# Patient Record
Sex: FEMALE | HISPANIC OR LATINO
[De-identification: names, ages, dates, MRNs, and addresses within clinical notes are randomized per-mention and may not be internally consistent; named-entity substitution may affect disease eponyms.]

---

## 2023-03-02 ENCOUNTER — NON-APPOINTMENT (OUTPATIENT)
Age: 43
End: 2023-03-02

## 2023-05-21 ENCOUNTER — NON-APPOINTMENT (OUTPATIENT)
Age: 43
End: 2023-05-21

## 2023-05-30 ENCOUNTER — NON-APPOINTMENT (OUTPATIENT)
Age: 43
End: 2023-05-30

## 2023-05-30 ENCOUNTER — APPOINTMENT (OUTPATIENT)
Dept: FAMILY MEDICINE | Facility: CLINIC | Age: 43
End: 2023-05-30
Payer: COMMERCIAL

## 2023-05-30 ENCOUNTER — TRANSCRIPTION ENCOUNTER (OUTPATIENT)
Age: 43
End: 2023-05-30

## 2023-05-30 VITALS
TEMPERATURE: 98.4 F | SYSTOLIC BLOOD PRESSURE: 126 MMHG | HEIGHT: 62.99 IN | OXYGEN SATURATION: 99 % | DIASTOLIC BLOOD PRESSURE: 70 MMHG | HEART RATE: 69 BPM | RESPIRATION RATE: 16 BRPM | WEIGHT: 143 LBS | BODY MASS INDEX: 25.34 KG/M2

## 2023-05-30 DIAGNOSIS — R94.31 ABNORMAL ELECTROCARDIOGRAM [ECG] [EKG]: ICD-10-CM

## 2023-05-30 DIAGNOSIS — Z83.438 FAMILY HISTORY OF OTHER DISORDER OF LIPOPROTEIN METABOLISM AND OTHER LIPIDEMIA: ICD-10-CM

## 2023-05-30 DIAGNOSIS — Z87.59 PERSONAL HISTORY OF OTHER COMPLICATIONS OF PREGNANCY, CHILDBIRTH AND THE PUERPERIUM: ICD-10-CM

## 2023-05-30 DIAGNOSIS — R79.89 OTHER SPECIFIED ABNORMAL FINDINGS OF BLOOD CHEMISTRY: ICD-10-CM

## 2023-05-30 DIAGNOSIS — E78.2 MIXED HYPERLIPIDEMIA: ICD-10-CM

## 2023-05-30 DIAGNOSIS — Z00.00 ENCOUNTER FOR GENERAL ADULT MEDICAL EXAMINATION W/OUT ABNORMAL FINDINGS: ICD-10-CM

## 2023-05-30 DIAGNOSIS — Z78.9 OTHER SPECIFIED HEALTH STATUS: ICD-10-CM

## 2023-05-30 PROCEDURE — 99386 PREV VISIT NEW AGE 40-64: CPT | Mod: 25

## 2023-05-30 PROCEDURE — 93000 ELECTROCARDIOGRAM COMPLETE: CPT

## 2023-05-30 PROCEDURE — 36415 COLL VENOUS BLD VENIPUNCTURE: CPT

## 2023-05-30 NOTE — PAST MEDICAL HISTORY
[Menstruating] : menstruating [Approximately ___] : the LMP was approximately [unfilled] [Normal Amount/Duration] : it was of a normal amount and duration [Regular Cycle Intervals] : have been regular [Total Preg ___] : G[unfilled] [Live Births ___] : P[unfilled]  [Full Term ___] : Full Term: [unfilled] [Premature ___] : Premature: [unfilled] [Abortions ___] : Abortions:[unfilled] [Living ___] : Living: [unfilled] [AB Spont ___] : miscarriages: [unfilled]
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
## 2023-05-30 NOTE — PLAN
[FreeTextEntry1] : \par \par #HM\par -CMP, CBC, Lipid, A1C, TSH w. rFT4, vitamin B12 level\par -Pt verbally consented to HIV and Hep C testing \par -Flu shot annually recommended- pt states she does annual flu shot \par -COVID-19 vaccination series completed\par -COVID19 booster x1\par -Tdap q10 years recommended (received during pregnancy)\par -Use of sunscreen\par -Physical activity and healthy lifestyle recommended\par -Mammogram\par \par #Epigastric pain\par -GGT, B-HCG, H. pylori stool Ag, Lipase\par -US abdomen \par -Pantoprazole 40 mg QD after stool Ag test completed \par \par #Abnormal EKG\par -EKG done today in office- T wave inversion in V3, V4- low voltage in precordial leads, RSR- V1, left axis, possible infarct\par -Cardiology evaluation recommended\par -TTE\par \par Labs drawn in office \par \par F/u in 6 weeks

## 2023-05-30 NOTE — HISTORY OF PRESENT ILLNESS
[FreeTextEntry1] : comprehensive physical exam\par epigastric discomfort [de-identified] : 41 y/o female with hx of elevated cholesterol and triglycerides presents today for comprehensive physical exam and to establish care.\par \par Pt also c/o epigastric discomfort x 2 days. She was vomiting and feeling nauseous but states these symptoms are intermittent and have resolved. She states epigastric pain resolved with emesis and symptoms are worse with eating pasta sauce. \par Patient states 2 months ago she felt a pressure in her epigastric region as well. She states she has had stomach issues in the past as well. \par \par Pt states she took Lamisil for nail fungus in the last few months and one month later she had to stop due to liver enzymes being affected. \par She states one month later liver levels were still elevated.\par

## 2023-05-30 NOTE — HEALTH RISK ASSESSMENT
[With Family] : lives with family [# of Members in Household ___] :  household currently consist of [unfilled] member(s) [Unemployed] : unemployed [College] : College [] :  [Sexually Active] : sexually active [Feels Safe at Home] : Feels safe at home [Good] : ~his/her~  mood as  good [Yes] : Yes [2 - 4 times a month (2 pts)] : 2-4 times a month (2 points) [1 or 2 (0 pts)] : 1 or 2 (0 points) [Never (0 pts)] : Never (0 points) [No] : In the past 12 months have you used drugs other than those required for medical reasons? No [No falls in past year] : Patient reported no falls in the past year [0] : 2) Feeling down, depressed, or hopeless: Not at all (0) [PHQ-2 Negative - No further assessment needed] : PHQ-2 Negative - No further assessment needed [Patient reported PAP Smear was normal] : Patient reported PAP Smear was normal [HIV Test offered] : HIV Test offered [Hepatitis C test offered] : Hepatitis C test offered [None] : None [Fully functional (bathing, dressing, toileting, transferring, walking, feeding)] : Fully functional (bathing, dressing, toileting, transferring, walking, feeding) [Fully functional (using the telephone, shopping, preparing meals, housekeeping, doing laundry, using] : Fully functional and needs no help or supervision to perform IADLs (using the telephone, shopping, preparing meals, housekeeping, doing laundry, using transportation, managing medications and managing finances) [Never] : Never [Audit-CScore] : 2 [de-identified] : sedentary  [de-identified] : wide variety- carbs, meat, chicken  [[id number]] : 0 [Change in mental status noted] : No change in mental status noted [Reports changes in vision] : Reports no changes in vision [Reports changes in dental health] : Reports no changes in dental health [PapSmearDate] : 05/23 [ColonoscopyComments] : never had one [de-identified] : lives with  and 1 yo son

## 2023-05-30 NOTE — REVIEW OF SYSTEMS
[Abdominal Pain] : abdominal pain [Heartburn] : heartburn [Negative] : Heme/Lymph [Nausea] : nausea [Vomiting] : vomiting [Constipation] : no constipation [Diarrhea] : diarrhea [Melena] : no melena

## 2023-05-30 NOTE — ASSESSMENT
[FreeTextEntry1] :  41 y/o female presents today for comprehensive physical exam and to establish care.

## 2023-06-01 LAB
ALBUMIN SERPL ELPH-MCNC: 4.7 G/DL
ALP BLD-CCNC: 197 U/L
ALT SERPL-CCNC: 883 U/L
ANION GAP SERPL CALC-SCNC: 13 MMOL/L
AST SERPL-CCNC: 142 U/L
BILIRUB SERPL-MCNC: 0.6 MG/DL
BUN SERPL-MCNC: 6 MG/DL
CALCIUM SERPL-MCNC: 9.9 MG/DL
CHLORIDE SERPL-SCNC: 102 MMOL/L
CHOLEST SERPL-MCNC: 233 MG/DL
CO2 SERPL-SCNC: 24 MMOL/L
CREAT SERPL-MCNC: 0.64 MG/DL
EGFR: 114 ML/MIN/1.73M2
GGT SERPL-CCNC: 342 U/L
GLUCOSE SERPL-MCNC: 115 MG/DL
H PYLORI AG STL QL: NEGATIVE
HCG SERPL-MCNC: <1 MIU/ML
HCV AB SER QL: NONREACTIVE
HCV S/CO RATIO: 0.13 S/CO
HDLC SERPL-MCNC: 48 MG/DL
HIV1+2 AB SPEC QL IA.RAPID: NONREACTIVE
LDLC SERPL CALC-MCNC: 152 MG/DL
LPL SERPL-CCNC: 21 U/L
NONHDLC SERPL-MCNC: 185 MG/DL
POTASSIUM SERPL-SCNC: 4.7 MMOL/L
PROT SERPL-MCNC: 7.3 G/DL
SODIUM SERPL-SCNC: 139 MMOL/L
TRIGL SERPL-MCNC: 167 MG/DL
TSH SERPL-ACNC: 1.35 UIU/ML
VIT B12 SERPL-MCNC: 1130 PG/ML

## 2023-06-08 LAB
ESTIMATED AVERAGE GLUCOSE: 123 MG/DL
HBA1C MFR BLD HPLC: 5.9 %

## 2023-06-26 ENCOUNTER — APPOINTMENT (OUTPATIENT)
Dept: FAMILY MEDICINE | Facility: CLINIC | Age: 43
End: 2023-06-26
Payer: COMMERCIAL

## 2023-06-26 VITALS
DIASTOLIC BLOOD PRESSURE: 81 MMHG | SYSTOLIC BLOOD PRESSURE: 121 MMHG | RESPIRATION RATE: 14 BRPM | OXYGEN SATURATION: 98 % | BODY MASS INDEX: 26.4 KG/M2 | TEMPERATURE: 98.2 F | HEIGHT: 62.99 IN | HEART RATE: 64 BPM | WEIGHT: 149 LBS

## 2023-06-26 DIAGNOSIS — K80.20 CALCULUS OF GALLBLADDER W/OUT CHOLECYSTITIS W/OUT OBSTRUCTION: ICD-10-CM

## 2023-06-26 DIAGNOSIS — Z87.898 PERSONAL HISTORY OF OTHER SPECIFIED CONDITIONS: ICD-10-CM

## 2023-06-26 DIAGNOSIS — R10.9 UNSPECIFIED ABDOMINAL PAIN: ICD-10-CM

## 2023-06-26 DIAGNOSIS — R74.01 ELEVATION OF LEVELS OF LIVER TRANSAMINASE LEVELS: ICD-10-CM

## 2023-06-26 DIAGNOSIS — R92.2 INCONCLUSIVE MAMMOGRAM: ICD-10-CM

## 2023-06-26 DIAGNOSIS — R79.89 OTHER SPECIFIED ABNORMAL FINDINGS OF BLOOD CHEMISTRY: ICD-10-CM

## 2023-06-26 DIAGNOSIS — Z87.19 PERSONAL HISTORY OF OTHER DISEASES OF THE DIGESTIVE SYSTEM: ICD-10-CM

## 2023-06-26 DIAGNOSIS — R74.8 ABNORMAL LEVELS OF OTHER SERUM ENZYMES: ICD-10-CM

## 2023-06-26 PROCEDURE — 36415 COLL VENOUS BLD VENIPUNCTURE: CPT

## 2023-06-26 PROCEDURE — 99214 OFFICE O/P EST MOD 30 MIN: CPT | Mod: 25

## 2023-06-26 NOTE — REVIEW OF SYSTEMS
[Itching] : Itching [Negative] : Heme/Lymph [Mole Changes] : no mole changes [Nail Changes] : no nail changes [Hair Changes] : no hair changes [Skin Rash] : no skin rash

## 2023-06-26 NOTE — PLAN
[FreeTextEntry1] : \par \par #Elevated LFTs\par #Elevated GGT\par -Avoid hepatotoxic medications\par -CMP, CBC, Hepatitis panel, GGT, TREMAINE, Bile acids, anti mitochondrial antibody, anti smooth muscle antibody, PT, PTT\par -US abdomen results discussed with patient- fatty liver and gallstones\par -Gastroenterology evaluation \par \par -Dense breasts on mammogram- breast US recommended \par \par Labs drawn in office

## 2023-06-26 NOTE — HISTORY OF PRESENT ILLNESS
[FreeTextEntry1] : elevated LFTs [de-identified] : 44 y/o female with hx of elevated cholesterol and triglycerides presents today for follow up elevated LFTs.\par \par Patient states she took Lamisil for 2 months (started in 11/2022) and checked LFTs in [facility] in 01/2023 which were LFTs - 32 and 37.1 (patient showed labs from her electronic device).\par She states in 03/2023 LFTs were repeated and results were as follows: AST-20, ALT- 34, total protein- 7, albumin- 4.2, total bilirubin- 0.3,  direct bilirubin -0.1, indirect bilirubin- 0.2, alk phos- 75 \par \par Patient states one drink of alcohol per month and recently went to [place] about 3 weeks ago and had about 4 drinks per day. \par Pt states she was breastfeeding in 2021 and had not been drinking alcohol in 2021 and 2022. \par Pt states epigastric pain, nausea and vomiting have resolved.\par \par Pt reports pruritus occurring more frequently over the past few months.

## 2023-06-26 NOTE — ASSESSMENT
[FreeTextEntry1] : 44 y/o female with hx of elevated cholesterol and triglycerides presents today for follow up elevated LFTs.
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
## 2023-06-28 LAB
ALBUMIN SERPL ELPH-MCNC: 4.5 G/DL
ALP BLD-CCNC: 72 U/L
ALT SERPL-CCNC: 18 U/L
ANA SER IF-ACNC: NEGATIVE
ANION GAP SERPL CALC-SCNC: 14 MMOL/L
APTT BLD: 32.9 SEC
AST SERPL-CCNC: 15 U/L
BILIRUB SERPL-MCNC: 0.5 MG/DL
BUN SERPL-MCNC: 11 MG/DL
CALCIUM SERPL-MCNC: 9.5 MG/DL
CHLORIDE SERPL-SCNC: 104 MMOL/L
CO2 SERPL-SCNC: 23 MMOL/L
CREAT SERPL-MCNC: 0.71 MG/DL
EGFR: 109 ML/MIN/1.73M2
GGT SERPL-CCNC: 32 U/L
GLUCOSE SERPL-MCNC: 95 MG/DL
HAV IGM SER QL: NONREACTIVE
HBV CORE IGM SER QL: NONREACTIVE
HBV SURFACE AG SER QL: NONREACTIVE
HCV AB SER QL: NONREACTIVE
HCV S/CO RATIO: 0.1 S/CO
INR PPP: 0.99 RATIO
MITOCHONDRIA AB SER IF-ACNC: NORMAL
POTASSIUM SERPL-SCNC: 5.3 MMOL/L
PROT SERPL-MCNC: 6.9 G/DL
PT BLD: 11.5 SEC
SMOOTH MUSCLE AB SER QL IF: ABNORMAL
SODIUM SERPL-SCNC: 142 MMOL/L

## 2023-06-29 LAB — BILE AC SER-MCNC: 1.2 UMOL/L

## 2023-07-06 ENCOUNTER — APPOINTMENT (OUTPATIENT)
Dept: HEART AND VASCULAR | Facility: CLINIC | Age: 43
End: 2023-07-06
Payer: COMMERCIAL

## 2023-07-06 PROCEDURE — 93306 TTE W/DOPPLER COMPLETE: CPT

## 2023-07-09 ENCOUNTER — NON-APPOINTMENT (OUTPATIENT)
Age: 43
End: 2023-07-09

## 2023-07-31 ENCOUNTER — APPOINTMENT (OUTPATIENT)
Dept: HEART AND VASCULAR | Facility: CLINIC | Age: 43
End: 2023-07-31

## 2023-08-25 ENCOUNTER — RX RENEWAL (OUTPATIENT)
Age: 43
End: 2023-08-25

## 2023-08-25 RX ORDER — PANTOPRAZOLE 40 MG/1
40 TABLET, DELAYED RELEASE ORAL DAILY
Qty: 90 | Refills: 0 | Status: ACTIVE | COMMUNITY
Start: 2023-05-30 | End: 1900-01-01